# Patient Record
Sex: MALE | Race: WHITE
[De-identification: names, ages, dates, MRNs, and addresses within clinical notes are randomized per-mention and may not be internally consistent; named-entity substitution may affect disease eponyms.]

---

## 2019-08-23 ENCOUNTER — HOSPITAL ENCOUNTER (EMERGENCY)
Dept: HOSPITAL 61 - ER | Age: 39
Discharge: HOME | End: 2019-08-23
Payer: SELF-PAY

## 2019-08-23 VITALS — HEIGHT: 72 IN | WEIGHT: 243.37 LBS | BODY MASS INDEX: 32.96 KG/M2

## 2019-08-23 VITALS — SYSTOLIC BLOOD PRESSURE: 202 MMHG | DIASTOLIC BLOOD PRESSURE: 96 MMHG

## 2019-08-23 DIAGNOSIS — S06.0X1A: Primary | ICD-10-CM

## 2019-08-23 DIAGNOSIS — S20.212A: ICD-10-CM
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
## 2019-08-23 DIAGNOSIS — S01.81XA: ICD-10-CM
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
## 2019-08-23 PROCEDURE — 90471 IMMUNIZATION ADMIN: CPT

## 2019-08-23 PROCEDURE — 96372 THER/PROPH/DIAG INJ SC/IM: CPT

## 2019-08-23 PROCEDURE — 99284 EMERGENCY DEPT VISIT MOD MDM: CPT

## 2019-08-23 PROCEDURE — 70450 CT HEAD/BRAIN W/O DYE: CPT

## 2019-08-23 PROCEDURE — 71250 CT THORAX DX C-: CPT

## 2019-08-23 PROCEDURE — 12013 RPR F/E/E/N/L/M 2.6-5.0 CM: CPT

## 2019-08-23 PROCEDURE — 90715 TDAP VACCINE 7 YRS/> IM: CPT
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
